# Patient Record
Sex: MALE | Race: WHITE | URBAN - METROPOLITAN AREA
[De-identification: names, ages, dates, MRNs, and addresses within clinical notes are randomized per-mention and may not be internally consistent; named-entity substitution may affect disease eponyms.]

---

## 2017-07-26 ENCOUNTER — APPOINTMENT (OUTPATIENT)
Dept: CARDIOLOGY | Facility: CLINIC | Age: 82
End: 2017-07-26
Attending: PHYSICIAN ASSISTANT
Payer: MEDICARE

## 2017-07-26 ENCOUNTER — APPOINTMENT (OUTPATIENT)
Dept: GENERAL RADIOLOGY | Facility: CLINIC | Age: 82
End: 2017-07-26
Attending: EMERGENCY MEDICINE
Payer: MEDICARE

## 2017-07-26 ENCOUNTER — HOSPITAL ENCOUNTER (OUTPATIENT)
Facility: CLINIC | Age: 82
Setting detail: OBSERVATION
Discharge: HOME | End: 2017-07-26
Attending: EMERGENCY MEDICINE | Admitting: INTERNAL MEDICINE
Payer: MEDICARE

## 2017-07-26 VITALS
DIASTOLIC BLOOD PRESSURE: 63 MMHG | BODY MASS INDEX: 27.6 KG/M2 | TEMPERATURE: 97.2 F | RESPIRATION RATE: 18 BRPM | WEIGHT: 203.8 LBS | SYSTOLIC BLOOD PRESSURE: 138 MMHG | HEIGHT: 72 IN | OXYGEN SATURATION: 100 %

## 2017-07-26 DIAGNOSIS — R07.9 CHEST PAIN, UNSPECIFIED TYPE: ICD-10-CM

## 2017-07-26 LAB
ALBUMIN SERPL-MCNC: 3.1 G/DL (ref 3.4–5)
ALP SERPL-CCNC: 111 U/L (ref 40–150)
ALT SERPL W P-5'-P-CCNC: 21 U/L (ref 0–70)
ANION GAP SERPL CALCULATED.3IONS-SCNC: 10 MMOL/L (ref 3–14)
AST SERPL W P-5'-P-CCNC: 19 U/L (ref 0–45)
BASOPHILS # BLD AUTO: 0 10E9/L (ref 0–0.2)
BASOPHILS NFR BLD AUTO: 0.7 %
BILIRUB SERPL-MCNC: 0.4 MG/DL (ref 0.2–1.3)
BUN SERPL-MCNC: 27 MG/DL (ref 7–30)
CALCIUM SERPL-MCNC: 8.5 MG/DL (ref 8.5–10.1)
CHLORIDE SERPL-SCNC: 103 MMOL/L (ref 94–109)
CO2 SERPL-SCNC: 23 MMOL/L (ref 20–32)
CREAT SERPL-MCNC: 1.13 MG/DL (ref 0.66–1.25)
DIFFERENTIAL METHOD BLD: ABNORMAL
EOSINOPHIL # BLD AUTO: 0.2 10E9/L (ref 0–0.7)
EOSINOPHIL NFR BLD AUTO: 3.3 %
ERYTHROCYTE [DISTWIDTH] IN BLOOD BY AUTOMATED COUNT: 15.4 % (ref 10–15)
GFR SERPL CREATININE-BSD FRML MDRD: 62 ML/MIN/1.7M2
GLUCOSE SERPL-MCNC: 83 MG/DL (ref 70–99)
HCT VFR BLD AUTO: 33.1 % (ref 40–53)
HGB BLD-MCNC: 11.2 G/DL (ref 13.3–17.7)
IMM GRANULOCYTES # BLD: 0 10E9/L (ref 0–0.4)
IMM GRANULOCYTES NFR BLD: 0.2 %
INR PPP: 2.63 (ref 0.86–1.14)
INTERPRETATION ECG - MUSE: NORMAL
LYMPHOCYTES # BLD AUTO: 1.3 10E9/L (ref 0.8–5.3)
LYMPHOCYTES NFR BLD AUTO: 22.1 %
MCH RBC QN AUTO: 30 PG (ref 26.5–33)
MCHC RBC AUTO-ENTMCNC: 33.8 G/DL (ref 31.5–36.5)
MCV RBC AUTO: 89 FL (ref 78–100)
MONOCYTES # BLD AUTO: 1.1 10E9/L (ref 0–1.3)
MONOCYTES NFR BLD AUTO: 18.8 %
NEUTROPHILS # BLD AUTO: 3.3 10E9/L (ref 1.6–8.3)
NEUTROPHILS NFR BLD AUTO: 54.9 %
NT-PROBNP SERPL-MCNC: 2699 PG/ML (ref 0–1800)
PLATELET # BLD AUTO: 140 10E9/L (ref 150–450)
POTASSIUM SERPL-SCNC: 4.4 MMOL/L (ref 3.4–5.3)
PROT SERPL-MCNC: 6.5 G/DL (ref 6.8–8.8)
RBC # BLD AUTO: 3.73 10E12/L (ref 4.4–5.9)
SODIUM SERPL-SCNC: 136 MMOL/L (ref 133–144)
TROPONIN I SERPL-MCNC: 0.02 UG/L (ref 0–0.04)
TROPONIN I SERPL-MCNC: 0.02 UG/L (ref 0–0.04)
TROPONIN I SERPL-MCNC: 0.03 UG/L (ref 0–0.04)
WBC # BLD AUTO: 6.1 10E9/L (ref 4–11)

## 2017-07-26 PROCEDURE — 25500064 ZZH RX 255 OP 636: Performed by: INTERNAL MEDICINE

## 2017-07-26 PROCEDURE — 36415 COLL VENOUS BLD VENIPUNCTURE: CPT | Performed by: INTERNAL MEDICINE

## 2017-07-26 PROCEDURE — 25000132 ZZH RX MED GY IP 250 OP 250 PS 637: Mod: GY | Performed by: INTERNAL MEDICINE

## 2017-07-26 PROCEDURE — 84484 ASSAY OF TROPONIN QUANT: CPT | Performed by: INTERNAL MEDICINE

## 2017-07-26 PROCEDURE — 99207 ZZC APP CREDIT; MD BILLING SHARED VISIT: CPT | Performed by: INTERNAL MEDICINE

## 2017-07-26 PROCEDURE — 85025 COMPLETE CBC W/AUTO DIFF WBC: CPT | Performed by: EMERGENCY MEDICINE

## 2017-07-26 PROCEDURE — A9270 NON-COVERED ITEM OR SERVICE: HCPCS | Mod: GY | Performed by: INTERNAL MEDICINE

## 2017-07-26 PROCEDURE — 99207 ZZC APP CREDIT; MD BILLING SHARED VISIT: CPT | Performed by: PHYSICIAN ASSISTANT

## 2017-07-26 PROCEDURE — G0378 HOSPITAL OBSERVATION PER HR: HCPCS

## 2017-07-26 PROCEDURE — 99285 EMERGENCY DEPT VISIT HI MDM: CPT | Mod: 25

## 2017-07-26 PROCEDURE — 93306 TTE W/DOPPLER COMPLETE: CPT | Mod: 26 | Performed by: INTERNAL MEDICINE

## 2017-07-26 PROCEDURE — 80053 COMPREHEN METABOLIC PANEL: CPT | Performed by: EMERGENCY MEDICINE

## 2017-07-26 PROCEDURE — 84484 ASSAY OF TROPONIN QUANT: CPT | Performed by: EMERGENCY MEDICINE

## 2017-07-26 PROCEDURE — 85610 PROTHROMBIN TIME: CPT | Performed by: EMERGENCY MEDICINE

## 2017-07-26 PROCEDURE — 99236 HOSP IP/OBS SAME DATE HI 85: CPT | Performed by: INTERNAL MEDICINE

## 2017-07-26 PROCEDURE — 71020 XR CHEST 2 VW: CPT

## 2017-07-26 PROCEDURE — 83880 ASSAY OF NATRIURETIC PEPTIDE: CPT | Performed by: EMERGENCY MEDICINE

## 2017-07-26 PROCEDURE — 40000264 ECHO COMPLETE WITH LUMASON

## 2017-07-26 PROCEDURE — 93005 ELECTROCARDIOGRAM TRACING: CPT

## 2017-07-26 RX ORDER — NITROGLYCERIN 0.4 MG/1
0.4 TABLET SUBLINGUAL EVERY 5 MIN PRN
Status: DISCONTINUED | OUTPATIENT
Start: 2017-07-26 | End: 2017-07-26 | Stop reason: HOSPADM

## 2017-07-26 RX ORDER — ACETAMINOPHEN 650 MG/1
650 SUPPOSITORY RECTAL EVERY 4 HOURS PRN
Status: DISCONTINUED | OUTPATIENT
Start: 2017-07-26 | End: 2017-07-26 | Stop reason: HOSPADM

## 2017-07-26 RX ORDER — ALUMINA, MAGNESIA, AND SIMETHICONE 2400; 2400; 240 MG/30ML; MG/30ML; MG/30ML
15-30 SUSPENSION ORAL EVERY 4 HOURS PRN
Status: DISCONTINUED | OUTPATIENT
Start: 2017-07-26 | End: 2017-07-26 | Stop reason: HOSPADM

## 2017-07-26 RX ORDER — FUROSEMIDE 40 MG
40 TABLET ORAL ONCE
Status: DISCONTINUED | OUTPATIENT
Start: 2017-07-26 | End: 2017-07-26 | Stop reason: HOSPADM

## 2017-07-26 RX ORDER — ACETAMINOPHEN 325 MG/1
650 TABLET ORAL EVERY 4 HOURS PRN
Status: DISCONTINUED | OUTPATIENT
Start: 2017-07-26 | End: 2017-07-26 | Stop reason: HOSPADM

## 2017-07-26 RX ORDER — METOPROLOL TARTRATE 50 MG
50 TABLET ORAL 2 TIMES DAILY
Status: DISCONTINUED | OUTPATIENT
Start: 2017-07-26 | End: 2017-07-26 | Stop reason: HOSPADM

## 2017-07-26 RX ORDER — CALCIUM CARBONATE 500 MG/1
TABLET, CHEWABLE ORAL ONCE
COMMUNITY

## 2017-07-26 RX ORDER — ISOSORBIDE MONONITRATE 30 MG/1
30 TABLET, EXTENDED RELEASE ORAL DAILY
Status: DISCONTINUED | OUTPATIENT
Start: 2017-07-26 | End: 2017-07-26 | Stop reason: HOSPADM

## 2017-07-26 RX ORDER — LIDOCAINE 40 MG/G
CREAM TOPICAL
Status: DISCONTINUED | OUTPATIENT
Start: 2017-07-26 | End: 2017-07-26 | Stop reason: HOSPADM

## 2017-07-26 RX ORDER — NALOXONE HYDROCHLORIDE 0.4 MG/ML
.1-.4 INJECTION, SOLUTION INTRAMUSCULAR; INTRAVENOUS; SUBCUTANEOUS
Status: DISCONTINUED | OUTPATIENT
Start: 2017-07-26 | End: 2017-07-26 | Stop reason: HOSPADM

## 2017-07-26 RX ADMIN — ISOSORBIDE MONONITRATE 30 MG: 30 TABLET, EXTENDED RELEASE ORAL at 10:07

## 2017-07-26 RX ADMIN — SULFUR HEXAFLUORIDE 2 ML: KIT at 13:57

## 2017-07-26 RX ADMIN — METOPROLOL TARTRATE 50 MG: 50 TABLET, FILM COATED ORAL at 10:08

## 2017-07-26 ASSESSMENT — ENCOUNTER SYMPTOMS
NECK PAIN: 1
SHORTNESS OF BREATH: 0

## 2017-07-26 NOTE — ED NOTES
Pt ambulated to bathroom using walker unassisted. Distance was approximately 50ft. Pt denied chest pain or sob. Nurse was alerted.

## 2017-07-26 NOTE — PLAN OF CARE
Problem: Goal Outcome Summary  Goal: Goal Outcome Summary  Outcome: Improving  PT is alert and oriented X4. Up independently.  Tele shows Afib with CVR.  Likely DC after echo results are read and documented.

## 2017-07-26 NOTE — IP AVS SNAPSHOT
[facility]    [address] [place] [id number]    Phone:  [phone]                                       After Visit Summary   7/26/2017    Charles Magana    MRN: [id number]           After Visit Summary Signature Page     I have received my discharge instructions, and my questions have been answered. I have discussed any challenges I see with this plan with the nurse or doctor.    ..........................................................................................................................................  Patient/Patient Representative Signature      ..........................................................................................................................................  Patient Representative Print Name and Relationship to Patient    ..................................................               ................................................  Date                                            Time    ..........................................................................................................................................  Reviewed by Signature/Title    ...................................................              ..............................................  Date                                                            Time

## 2017-07-26 NOTE — H&P
"[facility]    History and Physical  Hospitalist       Date of Admission:  7/26/2017    Assessment & Plan   Charles Magana is a 86 year old male with complex PMHx which includes CAD with hx of 2V CABG in 2012, chronic diastolic HF, hx of aflutter ablation, chronic afib on anticoagulation, hypertension, hyperlipidemia, pulmonary hypertension, mitral valve prolapse/mitral insufficiency, pulmonary hypertension and stage III CKD who present to the ED tonight for evaluation of chest pain with initial neg trop and nl EKG. HE is being admitted under observation for ongoing rule out.     Chest Pain - in setting of known CAD and prior bypass:  Some atypical features -- has awoken from sleep the past three nights with similar pain of \"indigestion\" and upper chest pain. Resolved the other night after taking a Tums. Pain tonight resolved after a total of 6 SL nitro. Pain tonight radiated into L arm (hadn't the nights prior). No c/o chest pain with exertion. Had neg ischemic workup earlier this year. Per cardiology visit on 6/29/17, noted his hx of chest pain has both typical and atypical features. Initial ischemic workup tonight was neg -- EKG w/o ischemic changes and initial trop neg.   -- patient currently chest pain free  -- serial troponins  -- have held off on ordering echo or Lexiscan as these tests were already obtained earlier this year  -- cont Metoprolol and Imdur    Chronic Afib:  Rate controlled and chronically anticoagulated with warfarin.   -- cont metoprolol as above  -- INR 2.6, if patient remains hospitalized will need to order consult for pharmacy to dose warfarin    Hypertension:  Chronic and stable on Metoprolol and lisinopril  -- cont metoprolol as above  -- holding lisinopril for now    Chronic Diastolic CHF:  Although proBNP mildly elevated and trace peripheral edema, no s/sx of exacerbation and CXR w/o vascular congestion.   -- Lasix held given admission under obs -- resume at " "discharge    Stage III CKD:  Baseline Cr 1.2 - 1.6. Renal function remains stable    MVP/Mitral insufficiency:  Follow up with cardiology as scheduled    DVT Prophylaxis: anticoagulated with warfarin  Code Status: Full Code -- as per POLST from [facility]    Disposition:  Anticipate < 2 midnight stay to complete workup so admitted under observation.    Jacqueline Aguilar DO    Primary Care Physician   Jonathan Bullock    Chief Complaint   Chest pain    History of Present Illness   Charles Magana is a 86 year old male with complex PMHx which includes CAD with hx of 2V CABG in 2012, chronic diastolic HF, hx of aflutter ablation, chronic afib on anticoagulation, hypertension, hyperlipidemia, pulmonary hypertension, mitral valve prolapse/mitral insufficiency, pulmonary hypertension and stage III CKD who present to the ED tonight for evaluation of chest pain.     He says his pain began tonight after he had been lying in bed for an hour. Describes as \"indigestion\" but doesn't further elaborate -- denies sharp, heavy, stabbing, achy or burning sensations. Pain mostly in his neck and upper chest. Tonight it radiated into his arm. He tells me that this pain has occurred the past two nights as well, after he has been lying in bed for an hour or so. The other night he took a Tums which relieved his pain. He didn't take any Tums tonight as he was worried about it interacting with his warfarin. He did take 3 SL nitro w/o improvement. Denies associated diaphoresis, sob, dizziness/lightheadedness, nausea. No worsening LE edema. Has a chronic cough that has remained unchanged. He says he isn't all that active anymore but denies cp during periods of exertion, mostly walking around his apartment building.     EMS was called and patient was brought to ED. En route he received an additional 3 SL nitro and full dose aspirin. Which led to improvement in his pain. In the ED, he was seen by Dr. Mccord. He was afebrile and " hemodynamically stable. His EKG showed rate controlled afib, no ischemic changes. His initial trop was neg. ProBNP was 2700 though minimal peripheral edema noted on exam and CXR was neg. No recurrence of cp in ED.     When I saw the patient, he was resting comfortably. Denied pain. Chart reviewed -- underwent ischemic evaluation earlier this year. Had Lexiscan in 2017 that was neg for inducible ischemia. An echocardiogram at that time showed a normal EF and moderate to severe mitral insufficiency dt mitral valve prolapse as well as moderate pulmonary hypertension. He just saw his cardiologist at the end of  -- noted he had atypical features associated with his chest pain (would be there in the morning and upon waking from sleep; no cp with use of stationary exercise bike). Recommended he use his SL nitro as needed. Also recommended he increase his Prilosec to 20mg BID for 2 weeks.     Past Medical History    Chronic diastolic CHF -- no specific mention of diastolic dysfunction on most recent echo in 2017  CAD with hx of 2V CABG in  --  Follows with [facility], underwent ischemic workup in 2017 with neg Lexiscan and nl EF  Hypertension  Hyperlipidemia  Mitral valve prolapse with moderate to severe mitral insufficiency/regurgittation  Chronic afib -- rate controlled, on anticoagulation with warfarin  Hx of aflutter s/p ablation in   Pulmonary hypertension  Stage III CKD, baseline Cr 1.2 - 1.6, follows with neprhology  Anemia of chronic disease  Hx of prostate cancer, s/p cryoablation in   Vitamin D deficiency    Past Surgical History    2V CABG in   Knee surgery  Aflutter ablation  Prostate cryoablation    Social History   Tobacco -- hx of 2ppd x 20yrs, quit in   EtOH -- drinks   Lives in a senior apartment building, wife recently  in 2017  Ambulates with use of walker    Family History   Mother -- breast cancer, heart disease  Father -- unkonwn  Siblings -- one brother with  hx of stroke    Review of Systems   A full 10 point Review of Systems was obtained and is negative unless otherwise stated per HPI.    Prior to Admission Medications   Prior to Admission Medications   Prescriptions Last Dose Informant Patient Reported? Taking?   Coenzyme Q10 (CO Q 10 PO)  Self Yes No   Sig: Take 100 mg by mouth daily.   METOPROLOL TARTRATE PO  Self Yes No   Sig: Take 50 mg by mouth 2 times daily.   Multiple Vitamins-Minerals (OCUVITE ADULT FORMULA) CAPS  Self Yes No   Sig: Take 1 mg by mouth. Unsure of dose   WARFARIN SODIUM PO   Yes No   Simg on M,W,F,Sa and 2.5mg all other days   finasteride (PROSCAR) 5 MG tablet   Yes No   Sig: Take 5 mg by mouth daily   furosemide (LASIX) 20 MG tablet   No No   Sig: Take 1 tablet (20 mg) by mouth daily   isosorbide mononitrate (IMDUR) 30 MG 24 hr tablet   No No   Sig: Take 1 tablet (30 mg) by mouth daily   lisinopril (PRINIVIL,ZESTRIL) 5 MG tablet   Yes No   Sig: Take 5 mg by mouth daily    omeprazole (PRILOSEC) 20 MG capsule   Yes No   Sig: Take 20 mg by mouth daily OTC        Allergies   No Known Allergies    Physical Exam   Temp: 98.6  F (37  C) Temp src: Oral BP: (!) 148/95   Heart Rate: 73 Resp: 24 SpO2: 95 % O2 Device: None (Room air)      Vital Signs with Ranges  Temp:  [98.6  F (37  C)] 98.6  F (37  C)  Heart Rate:  [58-73] 73  Resp:  [16-24] 24  BP: (148-153)/(89-95) 148/95  SpO2:  [95 %-99 %] 95 %  0 lbs 0 oz    Constitutional: Resting comfortably, alert and conversing appropriately, NAD  HEENT: PERRLA, EOMI, MMM  Respiratory: CTAB, no wheeze/rales/rhonchi, no increased work of breathing  Cardiovascular: irregularly irregular with ++SM throughout though greatest at apex, trace bilateral LE edema  GI: S, NT, ND, +BS  Skin: warm/dry, no rashes/lesions, juandice or ecchymosis  Neurologic: CNs 2-12 intact, no facial asymmetric, clear speech pattern, strength intact and symmetric in bilateral UE/LEs, sensation intact to light touch throughout, gait was  not assessed      Data     Recent Labs  Lab 07/26/17  0055   WBC 6.1   HGB 11.2*   MCV 89   *   INR 2.63*      POTASSIUM 4.4   CHLORIDE 103   CO2 23   BUN 27   CR 1.13   ANIONGAP 10   CHRISTY 8.5   GLC 83   ALBUMIN 3.1*   PROTTOTAL 6.5*   BILITOTAL 0.4   ALKPHOS 111   ALT 21   AST 19   TROPI 0.019       Recent Results (from the past 24 hour(s))   XR Chest 2 Views    Narrative    XR CHEST 2 VW  7/26/2017 1:18 AM      HISTORY: Chest pain.     COMPARISON: 1/20/2016.    FINDINGS: Sternal wires and mediastinal clips. No pneumothorax. The  heart is enlarged without pulmonary edema. Minimal atelectasis or  scarring at the lung bases. The lungs are otherwise clear. No pleural  effusion. Degenerative disease in the spine.      Impression    IMPRESSION: No acute abnormality.       EKG: rate controlled afib, no ischemic changes. Unchanged from EKG in 2012.

## 2017-07-26 NOTE — PLAN OF CARE
Problem: Discharge Planning  Goal: Discharge Planning (Adult, OB, Behavioral, Peds)  Outcome: Adequate for Discharge Date Met:  07/26/17  VS stable, up with a walker, tolerated diet, denies any pain or discomfort, pt is adequate to be discharged, instruction was given to pt.  Pt verbalized his understanding regarding instruction.  Daughter Zahida will transport pt to his assisted living.

## 2017-07-26 NOTE — DISCHARGE SUMMARY
"[facility]    Discharge Summary  Hospitalist    Date of Admission:  7/26/2017  Date of Discharge:  7/26/2017  Discharging Provider: Augustine Mckeon PA-C    Discharge Diagnoses   Chest pain, unspecified type    History of Present Illness   Charles Magana is an 86 year old male who presented with chest pain.    HPI from admission H&P:  Charles Magana is a 86 year old male with complex PMHx which includes CAD with hx of 2V CABG in 2012, chronic diastolic HF, hx of aflutter ablation, chronic afib on anticoagulation, hypertension, hyperlipidemia, pulmonary hypertension, mitral valve prolapse/mitral insufficiency, pulmonary hypertension and stage III CKD who present to the ED tonight for evaluation of chest pain.      He says his pain began tonight after he had been lying in bed for an hour. Describes as \"indigestion\" but doesn't further elaborate -- denies sharp, heavy, stabbing, achy or burning sensations. Pain mostly in his neck and upper chest. Tonight it radiated into his arm. He tells me that this pain has occurred the past two nights as well, after he has been lying in bed for an hour or so. The other night he took a Tums which relieved his pain. He didn't take any Tums tonight as he was worried about it interacting with his warfarin. He did take 3 SL nitro w/o improvement. Denies associated diaphoresis, sob, dizziness/lightheadedness, nausea. No worsening LE edema. Has a chronic cough that has remained unchanged. He says he isn't all that active anymore but denies cp during periods of exertion, mostly walking around his apartment building.      EMS was called and patient was brought to ED. En route he received an additional 3 SL nitro and full dose aspirin. Which led to improvement in his pain. In the ED, he was seen by Dr. Mccord. He was afebrile and hemodynamically stable. His EKG showed rate controlled afib, no ischemic changes. His initial trop was neg. ProBNP was 2700 though minimal " "peripheral edema noted on exam and CXR was neg. No recurrence of cp in ED.      When I saw the patient, he was resting comfortably. Denied pain. Chart reviewed -- underwent ischemic evaluation earlier this year. Had Lexiscan in 1/2017 that was neg for inducible ischemia. An echocardiogram at that time showed a normal EF and moderate to severe mitral insufficiency dt mitral valve prolapse as well as moderate pulmonary hypertension. He just saw his cardiologist at the end of June -- noted he had atypical features associated with his chest pain (would be there in the morning and upon waking from sleep; no cp with use of stationary exercise bike). Recommended he use his SL nitro as needed. Also recommended he increase his Prilosec to 20mg BID for 2 weeks.     Hospital Course   Charles Magana was admitted on 7/26/2017.  The following problems were addressed during his hospitalization:    Chest Pain - in setting of known CAD and prior bypass:  Some atypical features -- has awoken from sleep the past three nights with similar pain of \"indigestion\" and upper chest pain. Resolved the other night after taking a Tums. Pain day of admission radiated into L arm (hadn't the nights prior), resolved with nitro. No c/o chest pain with exertion. Had neg ischemic workup earlier this year. Per cardiology visit on 6/29/17, noted his hx of chest pain has both typical and atypical features. He was admitted under observation, serial troponins detectable but nonelevated, EKG nonischemic. Echo with EF 55%, slight volume overload indicated but overall unchanged from prior. He remained chest pain free during admission. Pain is most likely due to stopping his BID PPI trial on Sunday, resulting in reflux symptoms overnight few days PTA. He is recommended to resume BID dosing of omeprazole and follow closely with PCP and cardiology at discharge. His Imdur and metoprolol were resumed.     Chronic Afib:  Rate controlled and chronically " anticoagulated with warfarin.   -- cont metoprolol as above  -- INR 2.6, continue PTA warfarin dosing     Hypertension:  Chronic and stable on Metoprolol and lisinopril     Chronic Diastolic CHF:  Although proBNP mildly elevated and trace peripheral edema, no s/sx of exacerbation and CXR w/o vascular congestion. Echo indicating slight volume overload. His PTA PRN lasix dose was given x1 prior to discharge. He is to resume PTA lasix dosing of 20mg daily at discharge.     Stage III CKD:  Baseline Cr 1.2 - 1.6. Renal function remains stable     MVP/Mitral insufficiency:  Follow up with cardiology as scheduled    Augustine Mckeon PA-C    Significant Results and Procedures   As noted below    Pending Results   These results will be followed up by n/a  Unresulted Labs Ordered in the Past 30 Days of this Admission     No orders found from 5/27/2017 to 7/27/2017.          Code Status   Full Code       Primary Care Physician   Jonathan Bullock    Physical Exam   Temp: 95.8  F (35.4  C) Temp src: Oral BP: 139/65   Heart Rate: 74 Resp: 18 SpO2: 97 % O2 Device: None (Room air) Oxygen Delivery: 2 LPM  Vitals:    07/26/17 0248   Weight: 92.4 kg (203 lb 12.8 oz)     Vital Signs with Ranges  Temp:  [95.8  F (35.4  C)-98.6  F (37  C)] 95.8  F (35.4  C)  Heart Rate:  [58-78] 74  Resp:  [16-24] 18  BP: (139-162)/(65-95) 139/65  SpO2:  [95 %-100 %] 97 %       GEN: well-developed, well-nourished, appears comfortable  PULM: lungs CTA bilaterally, no increased work of breathing, no wheeze, rales, rhonchi  CV: irregularly irregular, S1 & S2  GI: soft, nontender, nondistended, no guarding or rigidity, +BS in all 4 quadrants  SKIN: warm & dry without rash, wound, trace to 1+ pedal edema    Discharge Disposition   Discharged to home  Condition at discharge: Stable    Consultations This Hospital Stay   None    Time Spent on this Encounter   I, Augustine Mckeon, personally saw the patient today and spent less than or equal to 30 minutes discharging  this patient.    Discharge Orders     Reason for your hospital stay   You were admitted for observation of chest discomfort. You were evaluated with labwork and an echocardiogram which were reassuring. The cause of your pain is most likely related to decreased dosing of your Prilosec over the weekend. You should resume your prior dosing of prilosec and continue to follow with your primary care provider and cardiologist.     Follow-up and recommended labs and tests    Follow up with primary care provider, Jonathan Bullock, within 7 days for hospital follow- up.  No follow up labs or test are needed.    Follow up with Cardiologist within 1-2 months for hospital follow-up.     Activity   Your activity upon discharge: activity as tolerated     Diet   Follow this diet upon discharge: Orders Placed This Encounter     Low Saturated Fat Na <2400 mg       Discharge Medications   Current Discharge Medication List      START taking these medications    Details   omeprazole (PRILOSEC) 20 MG CR capsule Take 1 capsule (20 mg) by mouth 2 times daily  Qty: 60 capsule, Refills: 1    Associated Diagnoses: Chest pain, unspecified type         CONTINUE these medications which have NOT CHANGED    Details   !! FUROSEMIDE PO Take 20 mg by mouth daily      !! FUROSEMIDE PO Take 20 mg by mouth daily as needed (For weight gain greater than 3 pounds overnight)      LISINOPRIL PO Take 5 mg by mouth daily      NITROGLYCERIN SL Place 0.4 mg under the tongue every 5 minutes as needed for chest pain      calcium carbonate (TUMS) 500 MG chewable tablet Take by mouth once       WARFARIN SODIUM PO Take 2.5-5 mg by mouth daily 5mg Mon, Wed, Fri, Sat  2.5mg Sun, Tue, Thurs      ASPIRIN PO Take 324 mg by mouth once      isosorbide mononitrate (IMDUR) 30 MG 24 hr tablet Take 1 tablet (30 mg) by mouth daily  Qty: 30 tablet, Refills: 1    Associated Diagnoses: Acute on chronic diastolic congestive heart failure (H); Coronary artery disease involving native  coronary artery of native heart, angina presence unspecified      finasteride (PROSCAR) 5 MG tablet Take 5 mg by mouth daily      METOPROLOL TARTRATE PO Take 50 mg by mouth 2 times daily.      Coenzyme Q10 (CO Q 10 PO) Take 100 mg by mouth daily.      Multiple Vitamins-Minerals (OCUVITE ADULT FORMULA) CAPS Take 1 mg by mouth. Unsure of dose       !! - Potential duplicate medications found. Please discuss with provider.        Allergies   No Known Allergies  Data   Most Recent 3 CBC's:  Recent Labs   Lab Test  07/26/17   0055  01/20/16   0740  01/19/16   0645   WBC  6.1  8.4  5.1   HGB  11.2*  8.9*  9.3*   MCV  89  92  93   PLT  140*  114*  112*      Most Recent 3 BMP's:  Recent Labs   Lab Test  07/26/17 0055  01/24/16   0658  01/23/16   0713   NA  136  139  143   POTASSIUM  4.4  4.1  3.9   CHLORIDE  103  107  108   CO2  23  24  27   BUN  27  43*  53*   CR  1.13  1.23  1.45*   ANIONGAP  10  8  8   CHRISTY  8.5  7.9*  8.1*   GLC  83  122*  91     Most Recent 2 LFT's:  Recent Labs   Lab Test  07/26/17   0055  01/18/16   1725   AST  19  34   ALT  21  27   ALKPHOS  111  97   BILITOTAL  0.4  0.9     Most Recent INR's and Anticoagulation Dosing History:  Anticoagulation Dose History     Recent Dosing and Labs Latest Ref Rng & Units 1/20/2016 1/21/2016 1/22/2016 1/23/2016 1/24/2016 1/25/2016 7/26/2017    Warfarin 1 mg - 1 mg 1 mg - - - - -    Warfarin 2.5 mg - - - 2.5 mg 2.5 mg - - -    Warfarin 4 mg - - - - - 4 mg - -    INR 0.86 - 1.14 2.69(H) 3.02(H) 2.97(H) 2.43(H) 2.18(H) 2.00(H) 2.63(H)        Most Recent 3 Troponin's:  Recent Labs   Lab Test  07/26/17   1205  07/26/17   0543  07/26/17   0055   TROPI  0.020  0.025  0.019     Most Recent Cholesterol Panel:  Recent Labs   Lab Test  05/17/12   0643   CHOL  172   LDL  101   HDL  48   TRIG  116     Most Recent 6 Bacteria Isolates From Any Culture (See EPIC Reports for Culture Details):  Recent Labs   Lab Test  01/18/16   1730  01/18/16   1725   CULT  No growth  No growth      Most Recent TSH, T4 and A1c Labs:  Recent Labs   Lab Test  01/21/16   0640   01/18/16   1725   TSH   --    --   1.11   A1C  6.3*   < >   --     < > = values in this interval not displayed.     Results for orders placed or performed during the hospital encounter of 07/26/17   XR Chest 2 Views    Narrative    XR CHEST 2 VW  7/26/2017 1:18 AM      HISTORY: Chest pain.     COMPARISON: 1/20/2016.    FINDINGS: Sternal wires and mediastinal clips. No pneumothorax. The  heart is enlarged without pulmonary edema. Minimal atelectasis or  scarring at the lung bases. The lungs are otherwise clear. No pleural  effusion. Degenerative disease in the spine.      Impression    IMPRESSION: No acute abnormality.    JOSE MORGAN MD

## 2017-07-26 NOTE — ED PROVIDER NOTES
History     Chief Complaint:  Chest pain     HPI   Charles Magana is a 86 year old male with history of CAD s/p CABG in 5/2010, chronic AFib, CHF, who presents by EMS from senior [facility] setting, for evaluation of chest pain. Patient is on warfarin.    Feeling of  indigestion  to his neck x 2 days, took new NTG pills he was just prescribed with no improvement, but Tums relieved the pain. Tonight he had pain again, but noticed no improvement tonight with Tums, so he called EMS. He notes that this evening the pain is accompanied by some arm and left chest pain. denies increased exertional dyspnea, or SOB at rest. No leg edema.     EMS:   Patient looked uncomfortable. Pain initially located to the neck, later localized the to substernal area, with nitro it improved. /80, in Afib. Supplemental O2, s/l NTG x 3 and 325 mg of ASA were provided.    Allergies:  No Known Allergies     Medications:    Furosemide  Imdur  Finasteride  Omeprazole  Lisinopril  Warfarin   ASA 81  Metoprolol      Past Medical History:    AFib  Pneumonia  CAD  CHF    Past Surgical History:    2 vessel CABG    Family History:    History is non-contributory.     Social History:  Marital Status:   [2]  Smoking status: former  Alcohol status: 2 shots of liquor a week  Patient presents alone by EMS.  PCP: Jonathan Bullock      Review of Systems   Respiratory: Negative for shortness of breath.    Cardiovascular: Positive for chest pain. Negative for leg swelling.   Musculoskeletal: Positive for neck pain.     10 point review of systems performed and is negative except as above and in HPI.     Physical Exam     Patient Vitals for the past 24 hrs:   BP Temp Temp src Heart Rate Resp SpO2 Height Weight   07/26/17 0248 162/77 97.4  F (36.3  C) Oral 78 16 100 % 1.829 m (6') 92.4 kg (203 lb 12.8 oz)   07/26/17 0152 (!) 148/95 - - 73 24 95 % - -   07/26/17 0100 - - - 70 21 99 % - -   07/26/17 0045 - - - 63 18 98 % - -   07/26/17 0029 153/89 98.6   F (37  C) Oral 58 16 96 % 1.829 m (6') -           Physical Exam  General: Resting on the gurney, appears somewhat uncomfortable  Head:  The scalp, face, and head appear normal  Mouth/Throat: Mucus membranes are moist  CV:  Regular rate    Normal S1 and S2  No pathological murmur   Resp:  Breath sounds clear and equal bilaterally    Non-labored, no retractions or accessory muscle use    No coarseness    No wheezing   GI:  Abdomen is soft, no rigidity    No tenderness to palpation  MS:  Normal motor assessment of all extremities.    Good capillary refill noted.    No lower extremity asymmetry, redness, swelling or excess warmth.  Skin:  No rash or lesions noted.  Neuro: Speech is normal and fluent. No apparent deficit.  Psych:  Awake. Alert.  Normal affect.      Appropriate interactions.     Emergency Department Course     ECG (00:28:00):  Indication: chest pain.   Rate 62 bpm. PA interval - ms. QRS duration 112 ms. QT/QTc 460/466 ms. R axis -28.   Interpretation: AFib with PVC or aberrantly conducted complexes. Nonspecific ST abnormality. Abnormal ECG.  Agree with computer interpretation.   No changes from previous ECG dated 05/17/2012  Interpreted by Florida Mccord MD.     Imaging:  Radiology findings were communicated with the patient who voiced understanding of the findings.    Chest XR, PA and LAT, per radiology:      No acute abnormality.    Laboratory:  Laboratory findings were communicated with the patient who voiced understanding of the findings.    CBC: WBC 6.1, HGB 11.2,   CMP: Creatinine 1.13, Alb 3.1, Protein 6.5     0055: Troponin I: 0.019  BNP: 2699  INR: 2.63    Emergency Department Course:  Nursing notes and vitals reviewed.  I performed an exam of the patient as documented above.     The patient was placed on continuous pulse oximetry and cardiac monitoring.   A peripheral IV was established and blood was drawn for laboratory testing, results above.  CXR obtained while in the emergency  department, findings above.     0146, I discussed the patient with Dr. Aguilar, hospitalist.     I rechecked the patient and the findings were explained to him, who consents to admission. I discussed the patient with Dr. Aguilar, who will admit the patient for further monitoring, evaluation and treatment.      Impression & Plan      Medical Decision Making:  Charles Magana is a 86 year old male with history as above, on coumadin, who presents with chest pain.  Their history and risk factor analysis are significant for CAD CABG in 2010. The workup in the Emergency Department (see above for cardiac enzymes and EKG)  is nonischemic.  My clinical suspicion of acute coronary syndrome is high enough to warrant further therapy and investigation.  I will admit the patient  to observation for further workup.  The patient is pain free after nitroglycerin and aspirin.     There is no clinical, laboratory, or radiographic evidence of pulmonary embolism, AAA, aortic dissection, pneumonia or pneumothorax.     The patient agrees to be admitted and all questions were answered.      Diagnosis:    ICD-10-CM   1. Chest pain, unspecified type R07.9     Disposition:   Observation     Scribe Disclosure:  Ladonna WINSLOW, am serving as a scribe at 12:27 AM on 7/26/2017 to document services personally performed by Florida Mccord MD, based on my observations and the provider's statements to me.    EMERGENCY DEPARTMENT       Florida Mccord MD  07/27/17 8738

## 2017-07-26 NOTE — PLAN OF CARE
Problem: Individualization  Goal: Patient Preferences  Outcome: Improving  Observation Goals  - Serial troponins complete.- No  - Seen and cleared by consultant if applicable - No  - Adequate pain control on oral analgesia -yes  - Vital signs normal or at patient baseline -yes  - Safe disposition plan has been identified -No  Pt A&O, VSS, pt in A-Fib with controled rate.  Pt denies any C/P.  Troponin's neg times 1 currently, next draw at 0600.  Will continue to monitor.

## 2017-07-26 NOTE — ED NOTES
Bed: ED06  Expected date: 7/25/17  Expected time: 12:00 AM  Means of arrival: Ambulance  Comments:  Stephanie 536 86M chest discomfort

## 2017-07-26 NOTE — PHARMACY-ADMISSION MEDICATION HISTORY
Admission medication history interview status for the 7/26/2017  admission is complete. See EPIC admission navigator for prior to admission medications     Medication history source reliability:Good    Actions taken by pharmacist (provider contacted, etc): nursing home med list reviewed and called nurse     Additional medication history information not noted on PTA med list : Patient not routinely on aspirin or tums but got one dose    Medication reconciliation/reorder completed by provider prior to medication history? No    Time spent in this activity: 20 minutes    Prior to Admission medications    Medication Sig Last Dose Taking? Auth Provider   FUROSEMIDE PO Take 20 mg by mouth daily 7/25/2017 at Unknown time Yes Unknown, Entered By History   FUROSEMIDE PO Take 20 mg by mouth daily as needed (For weight gain greater than 3 pounds overnight)  Yes Unknown, Entered By History   LISINOPRIL PO Take 5 mg by mouth daily 7/25/2017 at Unknown time Yes Unknown, Entered By History   NITROGLYCERIN SL Place 0.4 mg under the tongue every 5 minutes as needed for chest pain 7/25/2017 at Unknown time Yes Unknown, Entered By History   calcium carbonate (TUMS) 500 MG chewable tablet Take by mouth once  7/25/2017 at Unknown time Yes Unknown, Entered By History   WARFARIN SODIUM PO Take 2.5-5 mg by mouth daily 5mg Mon, Wed, Fri, Sat  2.5mg Sun, Tue, Thurs 7/25/2017 at Unknown time Yes Unknown, Entered By History   ASPIRIN PO Take 324 mg by mouth once 7/25/2017 at Unknown time Yes Unknown, Entered By History   isosorbide mononitrate (IMDUR) 30 MG 24 hr tablet Take 1 tablet (30 mg) by mouth daily 7/25/2017 at Unknown time Yes Nhung Anguiano MD   finasteride (PROSCAR) 5 MG tablet Take 5 mg by mouth daily 7/25/2017 at Unknown time Yes Unknown, Entered By History   METOPROLOL TARTRATE PO Take 50 mg by mouth 2 times daily. 7/25/2017 at Unknown time Yes Unknown, Entered By History   Coenzyme Q10 (CO Q 10 PO) Take 100 mg by mouth  daily. 7/25/2017 at Unknown time Yes Reported, Patient   Multiple Vitamins-Minerals (OCUVITE ADULT FORMULA) CAPS Take 1 mg by mouth. Unsure of dose 7/25/2017 at Unknown time Yes Reported, Patient

## 2017-07-26 NOTE — ED NOTES
"[facility]  ED Nurse Handoff Report    ED Chief complaint: Chest Pain (Patient resides at senior living at [facility] in [place].  Trouble sleeping last couple of nights, thinking indigestion, taking Tums.  Tonight pain more in the the upper neck.  Patient took nitroglycerin, no relief.  EMS were called.  EMS gave 324 aspirin & 3 nitroglycerin due to pain moving from neck more into chest.  Pain from 6 down to a 1 with the nitroglycerin.  Hx of bypass in 2010.   Patient on Coumadin & hx of CHF. )      ED Diagnosis:   Final diagnoses:   Chest pain, unspecified type       Code Status: Full Code    Allergies: No Known Allergies    Activity level - Baseline/Home:  Stand with Assist    Activity Level - Current:   Stand with Assist, ambulates with walker     Needed?: No    Isolation: No  Infection: Not Applicable    Bariatric?: Yes    Vital Signs:   Vitals:    07/26/17 0029   BP: 153/89   Resp: 16   Temp: 98.6  F (37  C)   TempSrc: Oral   SpO2: 96%   Height: 1.829 m (6')       Cardiac Rhythm: ,   Cardiac  Cardiac Rhythm: Atrial fibrillation    Pain level: 0-10 Pain Scale: 2    Is this patient confused?: No    Patient Report: Initial Complaint: Patient complaints of indigestion last couple of days.  Taking Tums.  Tonight complaints of neck pain, felt different than the indigestion, took his prescribed nitroglycerin, did not help.   Focused Assessment:  Patient currently rates pain 2/10.   Reports he normally has SOB with exertion due to CHF, is not worse.  Chest pain noted with movement when removing his shirt upon arrival to ED.  Patient appears pale, states \"just don't feel right\".   Tests Performed:  Labs, Chest XR, EKG  Abnormal Results:  Refer to results.  BNP 2699  Treatments provided:      Family Comments:  None present.  Reports his wife passed away a few months ago.     OBS brochure/video discussed/provided to patient: Yes    ED Medications: Medications - No data to display    Drips " infusing?:  No      ED NURSE PHONE NUMBER: [phone]

## 2017-07-26 NOTE — IP AVS SNAPSHOT
MRN:[id number]                      After Visit Summary   7/26/2017    Charles Magana    MRN: [id number]           Thank you!     Thank you for choosing [facility] for your care. Our goal is always to provide you with excellent care. Hearing back from our patients is one way we can continue to improve our services. Please take a few minutes to complete the written survey that you may receive in the mail after you visit with us. Thank you!        Patient Information     Date Of Birth          7/12/1931        About your hospital stay     You were admitted on:  July 26, 2017 [facility]    You were discharged on:  July 26, 2017        Reason for your hospital stay       You were admitted for observation of chest discomfort. You were evaluated with labwork and an echocardiogram which were reassuring. The cause of your pain is most likely related to decreased dosing of your Prilosec over the weekend. You should resume your prior dosing of prilosec and continue to follow with your primary care provider and cardiologist.                  Who to Call     For medical emergencies, please call 911.  For non-urgent questions about your medical care, please call your primary care provider or clinic, [phone]          Attending Provider     Provider Specialty    Florida Mccord MD Emergency Medicine    Jacqueline Aguilar DO Internal Medicine       Primary Care Provider Office Phone # Fax #    Jonathan Bullock [phone]      After Care Instructions     Activity       Your activity upon discharge: activity as tolerated            Diet       Follow this diet upon discharge: Orders Placed This Encounter      Low Saturated Fat Na <2400 mg                  Follow-up Appointments     Follow-up and recommended labs and tests        Follow up with primary care provider, Jonathan Bullock, within 7 days for hospital follow- up.  No follow up  "labs or test are needed.    Follow up with Cardiologist within 1-2 months for hospital follow-up.                  Warfarin Instruction     You have started taking a medicine called warfarin. This is a blood-thinning medicine (anticoagulant). It helps prevent and treat blood clots.      Before leaving the hospital, make sure you know how much to take and how long to take it.      You will need regular blood tests to make sure your blood is clotting safely. It is very important to see your doctor for regular blood tests.    Talk to your doctor before taking any new medicine (this includes over-the-counter drugs and herbal products). Many medicines can interact with warfarin. This may cause more bleeding or too much clotting.     Eating a lot of vitamin K--found in green, leafy vegetables--can change the way warfarin works in your body. Do NOT avoid these foods. Instead, try to eat the same amount each day.     Bleeding is the most common side-effect of warfarin. You may notice bleeding gums, a bloody nose, bruises and bleeding longer when you cut yourself. See a doctor at once if:   o You cough up blood  o You find blood in your stool (poop)  o You have a deep cut, or a cut that bleeds longer than 10 minutes   o You have a bad cut, hard fall, accident or hit your head (go to urgent care or the emergency room).    For women who can get pregnant: This medicine can harm an unborn baby. Be very careful not to get pregnant while taking this medicine. If you think you might be pregnant, call your doctor right away.    For more information, read \"Guide to Warfarin Therapy,  the booklet you received in the hospital.        Pending Results     No orders found from 7/24/2017 to 7/27/2017.            Statement of Approval     Ordered          07/26/17 1601  I have reviewed and agree with all the recommendations and orders detailed in this document.  EFFECTIVE NOW     Approved and electronically signed by:  Augustine Mckeon PA-C     " "        Admission Information     Date & Time Provider Department Dept. Phone    2017 Jacqueline Aguilar  [facility] [phone]      Your Vitals Were     Blood Pressure Temperature Respirations Height Weight Pulse Oximetry    138/63 (BP Location: Right arm) 97.2  F (36.2  C) (Oral) 18 1.829 m (6') 92.4 kg (203 lb 12.8 oz) 100%    BMI (Body Mass Index)                   27.64 kg/m2           [facility] Information     [facility] lets you send messages to your doctor, view your test results, renew your prescriptions, schedule appointments and more. To sign up, go to [email or web address] . Click on \"Log in\" on the left side of the screen, which will take you to the Welcome page. Then click on \"Sign up Now\" on the right side of the page.     You will be asked to enter the access code listed below, as well as some personal information. Please follow the directions to create your username and password.     Your access code is: [email or web address]  Expires: 10/24/2017  4:32 PM     Your access code will  in 90 days. If you need help or a new code, please call your [place] clinic or [phone].        Care EveryWhere ID     This is your Care EveryWhere ID. This could be used by other organizations to access your [facility] medical records  [id number]        Equal Access to Services     [facility] Hospital[facility]KEL : Hadii nate cisse hadasho Sochristianoali, waaxda luqadaha, qaybta kaalmada adeegyada, shyanne christopher . So [facility] [phone].    ATENCIÓN: Si habla español, tiene a cary disposición servicios gratuitos de asistencia lingüística. Glory carrion [phone].    We comply with applicable federal civil rights laws and [place] laws. We do not discriminate on the basis of race, color, national origin, age, disability sex, sexual orientation or gender identity.               Review of your medicines      START taking        Dose / Directions    ranitidine 150 MG tablet   Commonly known as:  " ZANTAC   Used for:  Chest pain, unspecified type        Dose:  150 mg   Take 1 tablet (150 mg) by mouth 2 times daily   Quantity:  60 tablet   Refills:  1         CONTINUE these medicines which have NOT CHANGED        Dose / Directions    ASPIRIN PO        Dose:  324 mg   Take 324 mg by mouth once   Refills:  0       calcium carbonate 500 MG chewable tablet   Commonly known as:  TUMS        Take by mouth once   Refills:  0       CO Q 10 PO        Dose:  100 mg   Take 100 mg by mouth daily.   Refills:  0       finasteride 5 MG tablet   Commonly known as:  PROSCAR        Dose:  5 mg   Take 5 mg by mouth daily   Refills:  0       * FUROSEMIDE PO        Dose:  20 mg   Take 20 mg by mouth daily   Refills:  0       * FUROSEMIDE PO        Dose:  20 mg   Take 20 mg by mouth daily as needed (For weight gain greater than 3 pounds overnight)   Refills:  0       isosorbide mononitrate 30 MG 24 hr tablet   Commonly known as:  IMDUR   Used for:  Acute on chronic diastolic congestive heart failure (H), Coronary artery disease involving native coronary artery of native heart, angina presence unspecified        Dose:  30 mg   Take 1 tablet (30 mg) by mouth daily   Quantity:  30 tablet   Refills:  1       LISINOPRIL PO        Dose:  5 mg   Take 5 mg by mouth daily   Refills:  0       METOPROLOL TARTRATE PO        Dose:  50 mg   Take 50 mg by mouth 2 times daily.   Refills:  0       NITROGLYCERIN SL        Dose:  0.4 mg   Place 0.4 mg under the tongue every 5 minutes as needed for chest pain   Refills:  0       OCUVITE ADULT FORMULA Caps        Dose:  1 mg   Take 1 mg by mouth. Unsure of dose   Refills:  0       WARFARIN SODIUM PO        Dose:  2.5-5 mg   Take 2.5-5 mg by mouth daily 5mg Mon, Wed, Fri, Sat 2.5mg Sun, Tue, Thurs   Refills:  0       * Notice:  This list has 2 medication(s) that are the same as other medications prescribed for you. Read the directions carefully, and ask your doctor or other care provider to review them  with you.         Where to get your medicines      Some of these will need a paper prescription and others can be bought over the counter. Ask your nurse if you have questions.     You don't need a prescription for these medications     ranitidine 150 MG tablet                Protect others around you: Learn how to safely use, store and throw away your medicines at www.disposemymeds.org.             Medication List: This is a list of all your medications and when to take them. Check marks below indicate your daily home schedule. Keep this list as a reference.      Medications           Morning Afternoon Evening Bedtime As Needed    ASPIRIN PO   Take 324 mg by mouth once   Next Dose Due:  Resume taking as you do at home                                     calcium carbonate 500 MG chewable tablet   Commonly known as:  TUMS   Take by mouth once   Next Dose Due:  Resume taking as you do at home                                     CO Q 10 PO   Take 100 mg by mouth daily.   Next Dose Due:  Resume taking as you do at home                                     finasteride 5 MG tablet   Commonly known as:  PROSCAR   Take 5 mg by mouth daily   Next Dose Due:  Resume taking as you do at home                                     * FUROSEMIDE PO   Take 20 mg by mouth daily   Next Dose Due:  Resume taking as you do at home                                     * FUROSEMIDE PO   Take 20 mg by mouth daily as needed (For weight gain greater than 3 pounds overnight)                                   isosorbide mononitrate 30 MG 24 hr tablet   Commonly known as:  IMDUR   Take 1 tablet (30 mg) by mouth daily   Last time this was given:  30 mg on 7/26/2017 10:07 AM   Next Dose Due:  Tomorrow 7/27/17                                   LISINOPRIL PO   Take 5 mg by mouth daily   Next Dose Due:  Resume taking as you do at home                                     METOPROLOL TARTRATE PO   Take 50 mg by mouth 2 times daily.   Last time this was  given:  50 mg on 7/26/2017 10:08 AM   Next Dose Due:  Tonight at bedtime                                       NITROGLYCERIN SL   Place 0.4 mg under the tongue every 5 minutes as needed for chest pain   Next Dose Due:  Resume taking as you do at home                                     OCUVITE ADULT FORMULA Caps   Take 1 mg by mouth. Unsure of dose                                   ranitidine 150 MG tablet   Commonly known as:  ZANTAC   Take 1 tablet (150 mg) by mouth 2 times daily                                WARFARIN SODIUM PO   Take 2.5-5 mg by mouth daily 5mg Mon, Wed, Fri, Sat 2.5mg Sun, Tue, Thurs   Next Dose Due:  Resume taking as you do at home                                  * Notice:  This list has 2 medication(s) that are the same as other medications prescribed for you. Read the directions carefully, and ask your doctor or other care provider to review them with you.

## 2017-07-26 NOTE — PROGRESS NOTES
Observation Goals  - Serial troponins complete.- yes  - Seen and cleared by consultant if applicable - Echo results pending  - Adequate pain control on oral analgesia -yes  - Vital signs normal or at patient baseline -yes  - Safe disposition plan has been identified - yes

## 2017-07-26 NOTE — PROGRESS NOTES
Observation Goals  - Serial troponins complete.- No  - Seen and cleared by consultant if applicable - No  - Adequate pain control on oral analgesia -yes  - Vital signs normal or at patient baseline -yes  - Safe disposition plan has been identified - yes

## 2017-07-31 ENCOUNTER — DOCUMENTATION ONLY (OUTPATIENT)
Dept: OTHER | Facility: CLINIC | Age: 82
End: 2017-07-31

## 2017-07-31 PROBLEM — Z71.89 ACP (ADVANCE CARE PLANNING): Chronic | Status: ACTIVE | Noted: 2017-07-31

## 2017-12-27 ENCOUNTER — RECORDS - HEALTHEAST (OUTPATIENT)
Dept: LAB | Facility: CLINIC | Age: 82
End: 2017-12-27

## 2017-12-28 LAB
CHOLEST SERPL-MCNC: 185 MG/DL
CREAT SERPL-MCNC: 1.37 MG/DL (ref 0.7–1.3)
GFR SERPL CREATININE-BSD FRML MDRD: 49 ML/MIN/1.73M2

## 2018-01-10 ENCOUNTER — RECORDS - HEALTHEAST (OUTPATIENT)
Dept: LAB | Facility: CLINIC | Age: 83
End: 2018-01-10

## 2018-01-10 LAB — INR PPP: 2.71 (ref 0.9–1.1)

## 2018-01-15 ENCOUNTER — RECORDS - HEALTHEAST (OUTPATIENT)
Dept: LAB | Facility: CLINIC | Age: 83
End: 2018-01-15

## 2018-01-15 LAB — INR PPP: 3.07 (ref 0.9–1.1)

## 2018-01-24 ENCOUNTER — RECORDS - HEALTHEAST (OUTPATIENT)
Dept: LAB | Facility: CLINIC | Age: 83
End: 2018-01-24

## 2018-01-24 LAB — INR PPP: 2.18 (ref 0.9–1.1)

## 2018-02-05 ENCOUNTER — RECORDS - HEALTHEAST (OUTPATIENT)
Dept: LAB | Facility: CLINIC | Age: 83
End: 2018-02-05

## 2018-02-05 LAB — INR PPP: 2.17 (ref 0.9–1.1)

## 2018-02-08 ENCOUNTER — RECORDS - HEALTHEAST (OUTPATIENT)
Dept: LAB | Facility: CLINIC | Age: 83
End: 2018-02-08

## 2018-02-09 LAB — INR PPP: 2.62 (ref 0.9–1.1)

## 2018-02-20 ENCOUNTER — RECORDS - HEALTHEAST (OUTPATIENT)
Dept: LAB | Facility: CLINIC | Age: 83
End: 2018-02-20

## 2018-02-21 LAB — INR PPP: 2.73 (ref 0.9–1.1)

## 2018-03-15 ENCOUNTER — RECORDS - HEALTHEAST (OUTPATIENT)
Dept: LAB | Facility: CLINIC | Age: 83
End: 2018-03-15

## 2018-03-16 LAB — INR PPP: 3.55 (ref 0.9–1.1)

## 2018-03-22 ENCOUNTER — RECORDS - HEALTHEAST (OUTPATIENT)
Dept: LAB | Facility: CLINIC | Age: 83
End: 2018-03-22

## 2018-03-23 LAB — INR PPP: 3.23 (ref 0.9–1.1)

## 2018-03-29 ENCOUNTER — RECORDS - HEALTHEAST (OUTPATIENT)
Dept: LAB | Facility: CLINIC | Age: 83
End: 2018-03-29

## 2018-03-30 LAB — INR PPP: 2.45 (ref 0.9–1.1)

## 2018-04-10 ENCOUNTER — RECORDS - HEALTHEAST (OUTPATIENT)
Dept: LAB | Facility: CLINIC | Age: 83
End: 2018-04-10

## 2018-04-11 LAB
INR PPP: 2.71 (ref 0.9–1.1)
PSA SERPL-MCNC: 2.4 NG/ML (ref 0–6.5)

## 2018-04-18 ENCOUNTER — RECORDS - HEALTHEAST (OUTPATIENT)
Dept: LAB | Facility: CLINIC | Age: 83
End: 2018-04-18

## 2018-04-18 LAB — PSA SERPL-MCNC: 2.2 NG/ML (ref 0–6.5)

## 2018-04-25 ENCOUNTER — RECORDS - HEALTHEAST (OUTPATIENT)
Dept: LAB | Facility: CLINIC | Age: 83
End: 2018-04-25

## 2018-04-25 LAB — INR PPP: 2.14 (ref 0.9–1.1)

## 2018-05-08 ENCOUNTER — RECORDS - HEALTHEAST (OUTPATIENT)
Dept: LAB | Facility: CLINIC | Age: 83
End: 2018-05-08

## 2018-05-09 LAB — INR PPP: 3.3 (ref 0.9–1.1)

## 2018-05-23 ENCOUNTER — RECORDS - HEALTHEAST (OUTPATIENT)
Dept: LAB | Facility: CLINIC | Age: 83
End: 2018-05-23

## 2018-05-23 LAB — INR PPP: 2.29 (ref 0.9–1.1)

## 2018-05-24 ENCOUNTER — RECORDS - HEALTHEAST (OUTPATIENT)
Dept: LAB | Facility: CLINIC | Age: 83
End: 2018-05-24

## 2018-05-24 LAB
ALBUMIN SERPL-MCNC: 3.5 G/DL (ref 3.5–5)
ANION GAP SERPL CALCULATED.3IONS-SCNC: 13 MMOL/L (ref 5–18)
BUN SERPL-MCNC: 28 MG/DL (ref 8–28)
CALCIUM SERPL-MCNC: 9.7 MG/DL (ref 8.5–10.5)
CHLORIDE BLD-SCNC: 104 MMOL/L (ref 98–107)
CO2 SERPL-SCNC: 22 MMOL/L (ref 22–31)
CREAT SERPL-MCNC: 1.35 MG/DL (ref 0.7–1.3)
CREAT UR-MCNC: 95.2 MG/DL
GFR SERPL CREATININE-BSD FRML MDRD: 50 ML/MIN/1.73M2
GLUCOSE BLD-MCNC: 57 MG/DL (ref 70–125)
HGB BLD-MCNC: 11.7 G/DL (ref 14–18)
PHOSPHATE SERPL-MCNC: 2.7 MG/DL (ref 2.5–4.5)
POTASSIUM BLD-SCNC: 4.3 MMOL/L (ref 3.5–5)
PROTEIN, RANDOM URINE - HISTORICAL: 17 MG/DL
PROTEIN/CREAT RATIO, RANDOM UR: 0.18
SODIUM SERPL-SCNC: 139 MMOL/L (ref 136–145)

## 2018-05-25 LAB — 25(OH)D3 SERPL-MCNC: 16.1 NG/ML (ref 30–80)

## 2018-06-06 ENCOUNTER — RECORDS - HEALTHEAST (OUTPATIENT)
Dept: LAB | Facility: CLINIC | Age: 83
End: 2018-06-06

## 2018-06-06 LAB
ALBUMIN SERPL-MCNC: 2.9 G/DL (ref 3.5–5)
ANION GAP SERPL CALCULATED.3IONS-SCNC: 9 MMOL/L (ref 5–18)
BUN SERPL-MCNC: 22 MG/DL (ref 8–28)
CALCIUM SERPL-MCNC: 8.7 MG/DL (ref 8.5–10.5)
CHLORIDE BLD-SCNC: 105 MMOL/L (ref 98–107)
CO2 SERPL-SCNC: 23 MMOL/L (ref 22–31)
CREAT SERPL-MCNC: 1.28 MG/DL (ref 0.7–1.3)
GFR SERPL CREATININE-BSD FRML MDRD: 53 ML/MIN/1.73M2
GLUCOSE BLD-MCNC: 162 MG/DL (ref 70–125)
HGB BLD-MCNC: 10 G/DL (ref 14–18)
INR PPP: 3.91 (ref 0.9–1.1)
PHOSPHATE SERPL-MCNC: 1.6 MG/DL (ref 2.5–4.5)
POTASSIUM BLD-SCNC: 4 MMOL/L (ref 3.5–5)
SODIUM SERPL-SCNC: 137 MMOL/L (ref 136–145)
URATE SERPL-MCNC: 9.9 MG/DL (ref 3–8)

## 2018-06-12 ENCOUNTER — RECORDS - HEALTHEAST (OUTPATIENT)
Dept: LAB | Facility: CLINIC | Age: 83
End: 2018-06-12

## 2018-06-13 LAB — INR PPP: 3.88 (ref 0.9–1.1)

## 2018-06-18 ENCOUNTER — RECORDS - HEALTHEAST (OUTPATIENT)
Dept: LAB | Facility: CLINIC | Age: 83
End: 2018-06-18

## 2018-06-18 LAB — INR PPP: 2.45 (ref 0.9–1.1)

## 2018-06-21 ENCOUNTER — RECORDS - HEALTHEAST (OUTPATIENT)
Dept: LAB | Facility: CLINIC | Age: 83
End: 2018-06-21

## 2018-06-22 LAB — INR PPP: 2.79 (ref 0.9–1.1)

## 2018-06-28 ENCOUNTER — RECORDS - HEALTHEAST (OUTPATIENT)
Dept: LAB | Facility: CLINIC | Age: 83
End: 2018-06-28

## 2018-06-28 LAB — INR PPP: 3.32 (ref 0.9–1.1)

## 2018-06-29 ENCOUNTER — RECORDS - HEALTHEAST (OUTPATIENT)
Dept: LAB | Facility: CLINIC | Age: 83
End: 2018-06-29

## 2018-07-02 LAB — INR PPP: 2.64 (ref 0.9–1.1)

## 2018-07-10 ENCOUNTER — RECORDS - HEALTHEAST (OUTPATIENT)
Dept: LAB | Facility: CLINIC | Age: 83
End: 2018-07-10

## 2018-07-10 LAB — INR PPP: 3.55 (ref 0.9–1.1)

## 2018-07-18 ENCOUNTER — RECORDS - HEALTHEAST (OUTPATIENT)
Dept: LAB | Facility: CLINIC | Age: 83
End: 2018-07-18

## 2018-07-18 LAB — INR PPP: 3.11 (ref 0.9–1.1)

## 2018-08-02 ENCOUNTER — RECORDS - HEALTHEAST (OUTPATIENT)
Dept: LAB | Facility: CLINIC | Age: 83
End: 2018-08-02

## 2018-08-02 LAB — INR PPP: 2.4 (ref 0.9–1.1)

## 2018-08-15 ENCOUNTER — RECORDS - HEALTHEAST (OUTPATIENT)
Dept: LAB | Facility: CLINIC | Age: 83
End: 2018-08-15

## 2018-08-16 LAB — INR PPP: 3.57 (ref 0.9–1.1)

## 2018-08-27 ENCOUNTER — RECORDS - HEALTHEAST (OUTPATIENT)
Dept: LAB | Facility: CLINIC | Age: 83
End: 2018-08-27

## 2018-08-27 LAB — INR PPP: 2.22 (ref 0.9–1.1)

## 2018-09-10 ENCOUNTER — RECORDS - HEALTHEAST (OUTPATIENT)
Dept: LAB | Facility: CLINIC | Age: 83
End: 2018-09-10

## 2018-09-11 LAB — INR PPP: 2.07 (ref 0.9–1.1)

## 2018-10-01 ENCOUNTER — RECORDS - HEALTHEAST (OUTPATIENT)
Dept: LAB | Facility: CLINIC | Age: 83
End: 2018-10-01

## 2018-10-02 LAB — INR PPP: 2.09 (ref 0.9–1.1)

## 2018-10-23 ENCOUNTER — RECORDS - HEALTHEAST (OUTPATIENT)
Dept: LAB | Facility: CLINIC | Age: 83
End: 2018-10-23

## 2018-10-23 LAB — INR PPP: 2.23 (ref 0.9–1.1)

## 2018-11-19 ENCOUNTER — RECORDS - HEALTHEAST (OUTPATIENT)
Dept: LAB | Facility: CLINIC | Age: 83
End: 2018-11-19

## 2018-11-20 LAB — INR PPP: 2.01 (ref 0.9–1.1)

## 2018-12-12 ENCOUNTER — RECORDS - HEALTHEAST (OUTPATIENT)
Dept: LAB | Facility: CLINIC | Age: 83
End: 2018-12-12

## 2018-12-12 LAB
ALBUMIN SERPL-MCNC: 3.1 G/DL (ref 3.5–5)
ANION GAP SERPL CALCULATED.3IONS-SCNC: 7 MMOL/L (ref 5–18)
BUN SERPL-MCNC: 25 MG/DL (ref 8–28)
CALCIUM SERPL-MCNC: 9.3 MG/DL (ref 8.5–10.5)
CHLORIDE BLD-SCNC: 108 MMOL/L (ref 98–107)
CO2 SERPL-SCNC: 26 MMOL/L (ref 22–31)
CREAT SERPL-MCNC: 1.31 MG/DL (ref 0.7–1.3)
GFR SERPL CREATININE-BSD FRML MDRD: 52 ML/MIN/1.73M2
GLUCOSE BLD-MCNC: 164 MG/DL (ref 70–125)
HGB BLD-MCNC: 11.7 G/DL (ref 14–18)
PHOSPHATE SERPL-MCNC: 2.1 MG/DL (ref 2.5–4.5)
POTASSIUM BLD-SCNC: 4 MMOL/L (ref 3.5–5)
PTH-INTACT SERPL-MCNC: 95 PG/ML (ref 10–86)
SODIUM SERPL-SCNC: 141 MMOL/L (ref 136–145)

## 2018-12-13 LAB — 25(OH)D3 SERPL-MCNC: 10.9 NG/ML (ref 30–80)

## 2018-12-17 ENCOUNTER — RECORDS - HEALTHEAST (OUTPATIENT)
Dept: LAB | Facility: CLINIC | Age: 83
End: 2018-12-17

## 2018-12-18 LAB — INR PPP: 2.25 (ref 0.9–1.1)

## 2018-12-26 ENCOUNTER — RECORDS - HEALTHEAST (OUTPATIENT)
Dept: LAB | Facility: CLINIC | Age: 83
End: 2018-12-26

## 2018-12-26 LAB — INR PPP: 2.51 (ref 0.9–1.1)

## 2019-01-02 ENCOUNTER — RECORDS - HEALTHEAST (OUTPATIENT)
Dept: LAB | Facility: CLINIC | Age: 84
End: 2019-01-02

## 2019-01-02 LAB — INR PPP: 2.53 (ref 0.9–1.1)

## 2019-01-08 ENCOUNTER — RECORDS - HEALTHEAST (OUTPATIENT)
Dept: LAB | Facility: CLINIC | Age: 84
End: 2019-01-08

## 2019-01-09 LAB — INR PPP: 2.12 (ref 0.9–1.1)

## 2019-01-23 ENCOUNTER — RECORDS - HEALTHEAST (OUTPATIENT)
Dept: LAB | Facility: CLINIC | Age: 84
End: 2019-01-23

## 2019-01-23 LAB — INR PPP: 2.67 (ref 0.9–1.1)

## 2019-02-06 ENCOUNTER — RECORDS - HEALTHEAST (OUTPATIENT)
Dept: LAB | Facility: CLINIC | Age: 84
End: 2019-02-06

## 2019-02-06 LAB — INR PPP: 3.82 (ref 0.9–1.1)

## 2019-02-13 ENCOUNTER — RECORDS - HEALTHEAST (OUTPATIENT)
Dept: LAB | Facility: CLINIC | Age: 84
End: 2019-02-13

## 2019-02-13 LAB — INR PPP: 2.8 (ref 0.9–1.1)

## 2019-02-27 ENCOUNTER — RECORDS - HEALTHEAST (OUTPATIENT)
Dept: LAB | Facility: CLINIC | Age: 84
End: 2019-02-27

## 2019-02-27 LAB — INR PPP: 2.38 (ref 0.9–1.1)

## 2019-03-20 ENCOUNTER — RECORDS - HEALTHEAST (OUTPATIENT)
Dept: LAB | Facility: CLINIC | Age: 84
End: 2019-03-20

## 2019-03-20 LAB — INR PPP: 2.14 (ref 0.9–1.1)

## 2019-04-10 ENCOUNTER — RECORDS - HEALTHEAST (OUTPATIENT)
Dept: LAB | Facility: CLINIC | Age: 84
End: 2019-04-10

## 2019-04-10 LAB
ANION GAP SERPL CALCULATED.3IONS-SCNC: 9 MMOL/L (ref 5–18)
BUN SERPL-MCNC: 26 MG/DL (ref 8–28)
CALCIUM SERPL-MCNC: 9.6 MG/DL (ref 8.5–10.5)
CHLORIDE BLD-SCNC: 104 MMOL/L (ref 98–107)
CO2 SERPL-SCNC: 24 MMOL/L (ref 22–31)
CREAT SERPL-MCNC: 1.22 MG/DL (ref 0.7–1.3)
GFR SERPL CREATININE-BSD FRML MDRD: 56 ML/MIN/1.73M2
GLUCOSE BLD-MCNC: 88 MG/DL (ref 70–125)
INR PPP: 2.23 (ref 0.9–1.1)
POTASSIUM BLD-SCNC: 4.4 MMOL/L (ref 3.5–5)
SODIUM SERPL-SCNC: 137 MMOL/L (ref 136–145)

## 2019-04-29 ENCOUNTER — RECORDS - HEALTHEAST (OUTPATIENT)
Dept: LAB | Facility: CLINIC | Age: 84
End: 2019-04-29

## 2019-04-30 LAB — PSA SERPL-MCNC: 2.6 NG/ML (ref 0–6.5)

## 2019-05-07 ENCOUNTER — RECORDS - HEALTHEAST (OUTPATIENT)
Dept: LAB | Facility: CLINIC | Age: 84
End: 2019-05-07

## 2019-05-08 LAB — INR PPP: 2.96 (ref 0.9–1.1)

## 2019-05-23 ENCOUNTER — RECORDS - HEALTHEAST (OUTPATIENT)
Dept: LAB | Facility: CLINIC | Age: 84
End: 2019-05-23

## 2019-05-24 LAB — INR PPP: 2.98 (ref 0.9–1.1)

## 2019-06-04 ENCOUNTER — RECORDS - HEALTHEAST (OUTPATIENT)
Dept: LAB | Facility: CLINIC | Age: 84
End: 2019-06-04

## 2019-06-05 LAB
ALBUMIN SERPL-MCNC: 3.1 G/DL (ref 3.5–5)
ANION GAP SERPL CALCULATED.3IONS-SCNC: 6 MMOL/L (ref 5–18)
BUN SERPL-MCNC: 24 MG/DL (ref 8–28)
CALCIUM SERPL-MCNC: 9 MG/DL (ref 8.5–10.5)
CHLORIDE BLD-SCNC: 106 MMOL/L (ref 98–107)
CO2 SERPL-SCNC: 28 MMOL/L (ref 22–31)
CREAT SERPL-MCNC: 1.19 MG/DL (ref 0.7–1.3)
GFR SERPL CREATININE-BSD FRML MDRD: 58 ML/MIN/1.73M2
GLUCOSE BLD-MCNC: 122 MG/DL (ref 70–125)
HGB BLD-MCNC: 10.6 G/DL (ref 14–18)
INR PPP: 2.54 (ref 0.9–1.1)
PHOSPHATE SERPL-MCNC: 2.1 MG/DL (ref 2.5–4.5)
POTASSIUM BLD-SCNC: 3.7 MMOL/L (ref 3.5–5)
PTH-INTACT SERPL-MCNC: 99 PG/ML (ref 10–86)
SODIUM SERPL-SCNC: 140 MMOL/L (ref 136–145)
URATE SERPL-MCNC: 6.1 MG/DL (ref 3–8)

## 2019-06-06 LAB — 25(OH)D3 SERPL-MCNC: 10.9 NG/ML (ref 30–80)

## 2019-06-19 ENCOUNTER — RECORDS - HEALTHEAST (OUTPATIENT)
Dept: LAB | Facility: CLINIC | Age: 84
End: 2019-06-19

## 2019-06-19 LAB — INR PPP: 2.43 (ref 0.9–1.1)

## 2019-07-09 ENCOUNTER — RECORDS - HEALTHEAST (OUTPATIENT)
Dept: LAB | Facility: CLINIC | Age: 84
End: 2019-07-09

## 2019-07-10 LAB — INR PPP: 2.02 (ref 0.9–1.1)

## 2019-07-25 ENCOUNTER — RECORDS - HEALTHEAST (OUTPATIENT)
Dept: LAB | Facility: CLINIC | Age: 84
End: 2019-07-25

## 2019-07-25 LAB — INR PPP: 2.78 (ref 0.9–1.1)

## 2019-08-13 ENCOUNTER — RECORDS - HEALTHEAST (OUTPATIENT)
Dept: LAB | Facility: CLINIC | Age: 84
End: 2019-08-13

## 2019-08-14 LAB — INR PPP: 3.35 (ref 0.9–1.1)

## 2019-08-27 ENCOUNTER — RECORDS - HEALTHEAST (OUTPATIENT)
Dept: LAB | Facility: CLINIC | Age: 84
End: 2019-08-27

## 2019-08-28 LAB — INR PPP: 2.97 (ref 0.9–1.1)

## 2019-09-11 ENCOUNTER — RECORDS - HEALTHEAST (OUTPATIENT)
Dept: LAB | Facility: CLINIC | Age: 84
End: 2019-09-11

## 2019-09-11 LAB — INR PPP: 2.91 (ref 0.9–1.1)

## 2019-10-07 ENCOUNTER — RECORDS - HEALTHEAST (OUTPATIENT)
Dept: LAB | Facility: CLINIC | Age: 84
End: 2019-10-07

## 2019-10-07 LAB
ANION GAP SERPL CALCULATED.3IONS-SCNC: 11 MMOL/L (ref 5–18)
BUN SERPL-MCNC: 29 MG/DL (ref 8–28)
CALCIUM SERPL-MCNC: 9.6 MG/DL (ref 8.5–10.5)
CHLORIDE BLD-SCNC: 102 MMOL/L (ref 98–107)
CO2 SERPL-SCNC: 24 MMOL/L (ref 22–31)
CREAT SERPL-MCNC: 1.51 MG/DL (ref 0.7–1.3)
ERYTHROCYTE [DISTWIDTH] IN BLOOD BY AUTOMATED COUNT: 15.2 % (ref 11–14.5)
GFR SERPL CREATININE-BSD FRML MDRD: 44 ML/MIN/1.73M2
GLUCOSE BLD-MCNC: 106 MG/DL (ref 70–125)
HCT VFR BLD AUTO: 32.6 % (ref 40–54)
HGB BLD-MCNC: 10.6 G/DL (ref 14–18)
MCH RBC QN AUTO: 34.5 PG (ref 27–34)
MCHC RBC AUTO-ENTMCNC: 32.5 G/DL (ref 32–36)
MCV RBC AUTO: 106 FL (ref 80–100)
PLATELET # BLD AUTO: 132 THOU/UL (ref 140–440)
PMV BLD AUTO: 11 FL (ref 8.5–12.5)
POTASSIUM BLD-SCNC: 4.4 MMOL/L (ref 3.5–5)
RBC # BLD AUTO: 3.07 MILL/UL (ref 4.4–6.2)
SODIUM SERPL-SCNC: 137 MMOL/L (ref 136–145)
WBC: 5.8 THOU/UL (ref 4–11)

## 2019-10-18 ENCOUNTER — RECORDS - HEALTHEAST (OUTPATIENT)
Dept: LAB | Facility: CLINIC | Age: 84
End: 2019-10-18

## 2019-10-18 LAB
ANION GAP SERPL CALCULATED.3IONS-SCNC: 9 MMOL/L (ref 5–18)
BUN SERPL-MCNC: 31 MG/DL (ref 8–28)
CALCIUM SERPL-MCNC: 9.4 MG/DL (ref 8.5–10.5)
CHLORIDE BLD-SCNC: 105 MMOL/L (ref 98–107)
CO2 SERPL-SCNC: 27 MMOL/L (ref 22–31)
CREAT SERPL-MCNC: 1.43 MG/DL (ref 0.7–1.3)
GFR SERPL CREATININE-BSD FRML MDRD: 47 ML/MIN/1.73M2
GLUCOSE BLD-MCNC: 127 MG/DL (ref 70–125)
INR PPP: 3.56 (ref 0.9–1.1)
POTASSIUM BLD-SCNC: 4 MMOL/L (ref 3.5–5)
SODIUM SERPL-SCNC: 141 MMOL/L (ref 136–145)

## 2019-10-23 ENCOUNTER — RECORDS - HEALTHEAST (OUTPATIENT)
Dept: LAB | Facility: CLINIC | Age: 84
End: 2019-10-23

## 2019-10-23 LAB — INR PPP: 2.3 (ref 0.9–1.1)

## 2019-10-30 ENCOUNTER — RECORDS - HEALTHEAST (OUTPATIENT)
Dept: LAB | Facility: CLINIC | Age: 84
End: 2019-10-30

## 2019-10-30 LAB — INR PPP: 2.74 (ref 0.9–1.1)

## 2019-11-06 ENCOUNTER — RECORDS - HEALTHEAST (OUTPATIENT)
Dept: LAB | Facility: CLINIC | Age: 84
End: 2019-11-06

## 2019-11-06 LAB — INR PPP: 2.49 (ref 0.9–1.1)

## 2019-12-02 ENCOUNTER — RECORDS - HEALTHEAST (OUTPATIENT)
Dept: LAB | Facility: CLINIC | Age: 84
End: 2019-12-02

## 2019-12-03 LAB
ANION GAP SERPL CALCULATED.3IONS-SCNC: 6 MMOL/L (ref 5–18)
BUN SERPL-MCNC: 43 MG/DL (ref 8–28)
CALCIUM SERPL-MCNC: 8.9 MG/DL (ref 8.5–10.5)
CHLORIDE BLD-SCNC: 113 MMOL/L (ref 98–107)
CO2 SERPL-SCNC: 24 MMOL/L (ref 22–31)
CREAT SERPL-MCNC: 1.94 MG/DL (ref 0.7–1.3)
GFR SERPL CREATININE-BSD FRML MDRD: 33 ML/MIN/1.73M2
GLUCOSE BLD-MCNC: 92 MG/DL (ref 70–125)
POTASSIUM BLD-SCNC: 4.5 MMOL/L (ref 3.5–5)
SODIUM SERPL-SCNC: 143 MMOL/L (ref 136–145)

## 2019-12-05 ENCOUNTER — RECORDS - HEALTHEAST (OUTPATIENT)
Dept: LAB | Facility: CLINIC | Age: 84
End: 2019-12-05

## 2019-12-06 LAB
ANION GAP SERPL CALCULATED.3IONS-SCNC: 6 MMOL/L (ref 5–18)
BUN SERPL-MCNC: 42 MG/DL (ref 8–28)
CALCIUM SERPL-MCNC: 9 MG/DL (ref 8.5–10.5)
CHLORIDE BLD-SCNC: 111 MMOL/L (ref 98–107)
CO2 SERPL-SCNC: 23 MMOL/L (ref 22–31)
CREAT SERPL-MCNC: 1.81 MG/DL (ref 0.7–1.3)
GFR SERPL CREATININE-BSD FRML MDRD: 36 ML/MIN/1.73M2
GLUCOSE BLD-MCNC: 82 MG/DL (ref 70–125)
POTASSIUM BLD-SCNC: 4.8 MMOL/L (ref 3.5–5)
SODIUM SERPL-SCNC: 140 MMOL/L (ref 136–145)

## 2019-12-08 ENCOUNTER — RECORDS - HEALTHEAST (OUTPATIENT)
Dept: LAB | Facility: CLINIC | Age: 84
End: 2019-12-08

## 2019-12-09 LAB
ANION GAP SERPL CALCULATED.3IONS-SCNC: 6 MMOL/L (ref 5–18)
BUN SERPL-MCNC: 43 MG/DL (ref 8–28)
CALCIUM SERPL-MCNC: 8.9 MG/DL (ref 8.5–10.5)
CHLORIDE BLD-SCNC: 111 MMOL/L (ref 98–107)
CO2 SERPL-SCNC: 23 MMOL/L (ref 22–31)
CREAT SERPL-MCNC: 1.63 MG/DL (ref 0.7–1.3)
ERYTHROCYTE [DISTWIDTH] IN BLOOD BY AUTOMATED COUNT: 15.6 % (ref 11–14.5)
GFR SERPL CREATININE-BSD FRML MDRD: 40 ML/MIN/1.73M2
GLUCOSE BLD-MCNC: 78 MG/DL (ref 70–125)
HCT VFR BLD AUTO: 25.3 % (ref 40–54)
HGB BLD-MCNC: 8.1 G/DL (ref 14–18)
MCH RBC QN AUTO: 34.2 PG (ref 27–34)
MCHC RBC AUTO-ENTMCNC: 32 G/DL (ref 32–36)
MCV RBC AUTO: 107 FL (ref 80–100)
PLATELET # BLD AUTO: 167 THOU/UL (ref 140–440)
PMV BLD AUTO: 11.4 FL (ref 8.5–12.5)
POTASSIUM BLD-SCNC: 4.3 MMOL/L (ref 3.5–5)
RBC # BLD AUTO: 2.37 MILL/UL (ref 4.4–6.2)
SODIUM SERPL-SCNC: 140 MMOL/L (ref 136–145)
WBC: 5.3 THOU/UL (ref 4–11)

## 2020-01-07 ENCOUNTER — RECORDS - HEALTHEAST (OUTPATIENT)
Dept: LAB | Facility: CLINIC | Age: 85
End: 2020-01-07

## 2020-01-07 LAB
ALBUMIN SERPL-MCNC: 2.8 G/DL (ref 3.5–5)
ANION GAP SERPL CALCULATED.3IONS-SCNC: 7 MMOL/L (ref 5–18)
BUN SERPL-MCNC: 41 MG/DL (ref 8–28)
CALCIUM SERPL-MCNC: 8.9 MG/DL (ref 8.5–10.5)
CHLORIDE BLD-SCNC: 112 MMOL/L (ref 98–107)
CO2 SERPL-SCNC: 22 MMOL/L (ref 22–31)
CREAT SERPL-MCNC: 1.73 MG/DL (ref 0.7–1.3)
GFR SERPL CREATININE-BSD FRML MDRD: 37 ML/MIN/1.73M2
GLUCOSE BLD-MCNC: 110 MG/DL (ref 70–125)
INR PPP: 1.92 (ref 0.9–1.1)
PHOSPHATE SERPL-MCNC: 2.9 MG/DL (ref 2.5–4.5)
POTASSIUM BLD-SCNC: 4.6 MMOL/L (ref 3.5–5)
SODIUM SERPL-SCNC: 141 MMOL/L (ref 136–145)

## 2020-01-13 ENCOUNTER — RECORDS - HEALTHEAST (OUTPATIENT)
Dept: LAB | Facility: CLINIC | Age: 85
End: 2020-01-13

## 2020-01-13 LAB — INR PPP: 3.14 (ref 0.9–1.1)

## 2020-01-16 ENCOUNTER — RECORDS - HEALTHEAST (OUTPATIENT)
Dept: LAB | Facility: CLINIC | Age: 85
End: 2020-01-16

## 2020-01-16 LAB — INR PPP: 3.48 (ref 0.9–1.1)

## 2020-01-20 ENCOUNTER — RECORDS - HEALTHEAST (OUTPATIENT)
Dept: LAB | Facility: CLINIC | Age: 85
End: 2020-01-20

## 2020-01-20 LAB — INR PPP: 2.21 (ref 0.9–1.1)

## 2020-01-27 ENCOUNTER — RECORDS - HEALTHEAST (OUTPATIENT)
Dept: LAB | Facility: CLINIC | Age: 85
End: 2020-01-27

## 2020-01-27 LAB — INR PPP: 2.02 (ref 0.9–1.1)

## 2020-01-30 ENCOUNTER — RECORDS - HEALTHEAST (OUTPATIENT)
Dept: LAB | Facility: CLINIC | Age: 85
End: 2020-01-30

## 2020-01-30 LAB
ALBUMIN SERPL-MCNC: 3.1 G/DL (ref 3.5–5)
ANION GAP SERPL CALCULATED.3IONS-SCNC: 11 MMOL/L (ref 5–18)
BUN SERPL-MCNC: 43 MG/DL (ref 8–28)
CALCIUM SERPL-MCNC: 8.9 MG/DL (ref 8.5–10.5)
CHLORIDE BLD-SCNC: 105 MMOL/L (ref 98–107)
CO2 SERPL-SCNC: 23 MMOL/L (ref 22–31)
CREAT SERPL-MCNC: 1.87 MG/DL (ref 0.7–1.3)
ERYTHROCYTE [DISTWIDTH] IN BLOOD BY AUTOMATED COUNT: 16.5 % (ref 11–14.5)
GFR SERPL CREATININE-BSD FRML MDRD: 34 ML/MIN/1.73M2
GLUCOSE BLD-MCNC: 90 MG/DL (ref 70–125)
HCT VFR BLD AUTO: 29.2 % (ref 40–54)
HGB BLD-MCNC: 9.6 G/DL (ref 14–18)
MCH RBC QN AUTO: 34.9 PG (ref 27–34)
MCHC RBC AUTO-ENTMCNC: 32.9 G/DL (ref 32–36)
MCV RBC AUTO: 106 FL (ref 80–100)
PHOSPHATE SERPL-MCNC: 2.6 MG/DL (ref 2.5–4.5)
PLATELET # BLD AUTO: 137 THOU/UL (ref 140–440)
PMV BLD AUTO: 10.8 FL (ref 8.5–12.5)
POTASSIUM BLD-SCNC: 4.2 MMOL/L (ref 3.5–5)
PTH-INTACT SERPL-MCNC: 130 PG/ML (ref 10–86)
RBC # BLD AUTO: 2.75 MILL/UL (ref 4.4–6.2)
SODIUM SERPL-SCNC: 139 MMOL/L (ref 136–145)
URATE SERPL-MCNC: 6.6 MG/DL (ref 3–8)
WBC: 7.5 THOU/UL (ref 4–11)

## 2020-02-03 ENCOUNTER — RECORDS - HEALTHEAST (OUTPATIENT)
Dept: LAB | Facility: CLINIC | Age: 85
End: 2020-02-03

## 2020-02-03 LAB — INR PPP: 1.9 (ref 0.9–1.1)

## 2020-02-10 ENCOUNTER — RECORDS - HEALTHEAST (OUTPATIENT)
Dept: LAB | Facility: CLINIC | Age: 85
End: 2020-02-10

## 2020-02-10 LAB — INR PPP: 1.85 (ref 0.9–1.1)

## 2020-05-04 ENCOUNTER — RECORDS - HEALTHEAST (OUTPATIENT)
Dept: LAB | Facility: CLINIC | Age: 85
End: 2020-05-04

## 2020-05-04 LAB
ANION GAP SERPL CALCULATED.3IONS-SCNC: 7 MMOL/L (ref 5–18)
BNP SERPL-MCNC: 387 PG/ML (ref 0–93)
BUN SERPL-MCNC: 32 MG/DL (ref 8–28)
CALCIUM SERPL-MCNC: 8.2 MG/DL (ref 8.5–10.5)
CHLORIDE BLD-SCNC: 109 MMOL/L (ref 98–107)
CO2 SERPL-SCNC: 21 MMOL/L (ref 22–31)
CREAT SERPL-MCNC: 1.79 MG/DL (ref 0.7–1.3)
ERYTHROCYTE [DISTWIDTH] IN BLOOD BY AUTOMATED COUNT: 15.6 % (ref 11–14.5)
GFR SERPL CREATININE-BSD FRML MDRD: 36 ML/MIN/1.73M2
GLUCOSE BLD-MCNC: 149 MG/DL (ref 70–125)
HCT VFR BLD AUTO: 20.2 % (ref 40–54)
HGB BLD-MCNC: 6.4 G/DL (ref 14–18)
MAGNESIUM SERPL-MCNC: 1.5 MG/DL (ref 1.8–2.6)
MCH RBC QN AUTO: 32.5 PG (ref 27–34)
MCHC RBC AUTO-ENTMCNC: 31.7 G/DL (ref 32–36)
MCV RBC AUTO: 103 FL (ref 80–100)
PLATELET # BLD AUTO: 190 THOU/UL (ref 140–440)
PMV BLD AUTO: 10.2 FL (ref 8.5–12.5)
POTASSIUM BLD-SCNC: 3.9 MMOL/L (ref 3.5–5)
RBC # BLD AUTO: 1.97 MILL/UL (ref 4.4–6.2)
SODIUM SERPL-SCNC: 137 MMOL/L (ref 136–145)
WBC: 6 THOU/UL (ref 4–11)

## 2020-05-06 ENCOUNTER — RECORDS - HEALTHEAST (OUTPATIENT)
Dept: LAB | Facility: CLINIC | Age: 85
End: 2020-05-06

## 2020-05-06 LAB — HGB BLD-MCNC: 6.5 G/DL (ref 14–18)

## 2020-05-13 ENCOUNTER — RECORDS - HEALTHEAST (OUTPATIENT)
Dept: LAB | Facility: CLINIC | Age: 85
End: 2020-05-13

## 2020-05-13 LAB
ERYTHROCYTE [DISTWIDTH] IN BLOOD BY AUTOMATED COUNT: 17 % (ref 11–14.5)
HCT VFR BLD AUTO: 22 % (ref 40–54)
HGB BLD-MCNC: 6.8 G/DL (ref 14–18)
HGB BLD-MCNC: 6.8 G/DL (ref 14–18)
MCH RBC QN AUTO: 32.7 PG (ref 27–34)
MCHC RBC AUTO-ENTMCNC: 30.9 G/DL (ref 32–36)
MCV RBC AUTO: 106 FL (ref 80–100)
PLATELET # BLD AUTO: 232 THOU/UL (ref 140–440)
PMV BLD AUTO: 10.2 FL (ref 8.5–12.5)
RBC # BLD AUTO: 2.08 MILL/UL (ref 4.4–6.2)
WBC: 4.8 THOU/UL (ref 4–11)

## 2020-05-14 ENCOUNTER — RECORDS - HEALTHEAST (OUTPATIENT)
Dept: LAB | Facility: CLINIC | Age: 85
End: 2020-05-14

## 2020-05-14 LAB — URATE SERPL-MCNC: 6.6 MG/DL (ref 3–8)

## 2020-05-20 ENCOUNTER — RECORDS - HEALTHEAST (OUTPATIENT)
Dept: LAB | Facility: CLINIC | Age: 85
End: 2020-05-20

## 2020-05-20 LAB — HGB BLD-MCNC: 6.7 G/DL (ref 14–18)
